# Patient Record
Sex: MALE | Race: WHITE | NOT HISPANIC OR LATINO | Employment: FULL TIME | URBAN - NONMETROPOLITAN AREA
[De-identification: names, ages, dates, MRNs, and addresses within clinical notes are randomized per-mention and may not be internally consistent; named-entity substitution may affect disease eponyms.]

---
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
## 2024-03-26 ENCOUNTER — OFFICE VISIT (OUTPATIENT)
Dept: FAMILY MEDICINE | Facility: OTHER | Age: 19
End: 2024-03-26
Attending: PHYSICIAN ASSISTANT
Payer: COMMERCIAL

## 2024-03-26 VITALS
HEART RATE: 68 BPM | WEIGHT: 231 LBS | RESPIRATION RATE: 20 BRPM | SYSTOLIC BLOOD PRESSURE: 138 MMHG | HEIGHT: 70 IN | BODY MASS INDEX: 33.07 KG/M2 | TEMPERATURE: 97.5 F | OXYGEN SATURATION: 97 % | DIASTOLIC BLOOD PRESSURE: 84 MMHG

## 2024-03-26 DIAGNOSIS — R25.1 SHAKINESS: ICD-10-CM

## 2024-03-26 DIAGNOSIS — E66.811 CLASS 1 OBESITY: ICD-10-CM

## 2024-03-26 DIAGNOSIS — R39.15 URINARY URGENCY: Primary | ICD-10-CM

## 2024-03-26 DIAGNOSIS — R53.82 CHRONIC FATIGUE: ICD-10-CM

## 2024-03-26 DIAGNOSIS — Z83.3 FAMILY HISTORY OF DIABETES MELLITUS: ICD-10-CM

## 2024-03-26 LAB
ALBUMIN SERPL BCG-MCNC: 4.8 G/DL (ref 3.5–5.2)
ALBUMIN UR-MCNC: NEGATIVE MG/DL
ALP SERPL-CCNC: 77 U/L (ref 65–260)
ALT SERPL W P-5'-P-CCNC: 43 U/L (ref 0–50)
AMORPH CRY #/AREA URNS HPF: ABNORMAL /HPF
ANION GAP SERPL CALCULATED.3IONS-SCNC: 9 MMOL/L (ref 7–15)
APPEARANCE UR: ABNORMAL
AST SERPL W P-5'-P-CCNC: 27 U/L (ref 0–35)
BASOPHILS # BLD AUTO: 0.1 10E3/UL (ref 0–0.2)
BASOPHILS NFR BLD AUTO: 1 %
BILIRUB SERPL-MCNC: 0.3 MG/DL
BILIRUB UR QL STRIP: NEGATIVE
BUN SERPL-MCNC: 11.9 MG/DL (ref 6–20)
CALCIUM SERPL-MCNC: 9.5 MG/DL (ref 8.6–10)
CHLORIDE SERPL-SCNC: 104 MMOL/L (ref 98–107)
COLOR UR AUTO: YELLOW
CREAT SERPL-MCNC: 0.79 MG/DL (ref 0.67–1.17)
CREAT UR-MCNC: 218.5 MG/DL
DEPRECATED HCO3 PLAS-SCNC: 27 MMOL/L (ref 22–29)
EGFRCR SERPLBLD CKD-EPI 2021: >90 ML/MIN/1.73M2
EOSINOPHIL # BLD AUTO: 0.3 10E3/UL (ref 0–0.7)
EOSINOPHIL NFR BLD AUTO: 4 %
ERYTHROCYTE [DISTWIDTH] IN BLOOD BY AUTOMATED COUNT: 11.6 % (ref 10–15)
GLUCOSE SERPL-MCNC: 90 MG/DL (ref 70–99)
GLUCOSE UR STRIP-MCNC: NEGATIVE MG/DL
HBA1C MFR BLD: 5 % (ref 4–6.2)
HCT VFR BLD AUTO: 40.4 % (ref 40–53)
HGB BLD-MCNC: 13.5 G/DL (ref 13.3–17.7)
HGB UR QL STRIP: ABNORMAL
IMM GRANULOCYTES # BLD: 0 10E3/UL
IMM GRANULOCYTES NFR BLD: 0 %
KETONES UR STRIP-MCNC: NEGATIVE MG/DL
LEUKOCYTE ESTERASE UR QL STRIP: NEGATIVE
LYMPHOCYTES # BLD AUTO: 2.4 10E3/UL (ref 0.8–5.3)
LYMPHOCYTES NFR BLD AUTO: 39 %
MCH RBC QN AUTO: 29.7 PG (ref 26.5–33)
MCHC RBC AUTO-ENTMCNC: 33.4 G/DL (ref 31.5–36.5)
MCV RBC AUTO: 89 FL (ref 78–100)
MICROALBUMIN UR-MCNC: <12 MG/L
MICROALBUMIN/CREAT UR: NORMAL MG/G{CREAT}
MONOCYTES # BLD AUTO: 0.6 10E3/UL (ref 0–1.3)
MONOCYTES NFR BLD AUTO: 10 %
MUCOUS THREADS #/AREA URNS LPF: PRESENT /LPF
NEUTROPHILS # BLD AUTO: 2.9 10E3/UL (ref 1.6–8.3)
NEUTROPHILS NFR BLD AUTO: 46 %
NITRATE UR QL: NEGATIVE
NRBC # BLD AUTO: 0 10E3/UL
NRBC BLD AUTO-RTO: 0 /100
PH UR STRIP: 5.5 [PH] (ref 5–9)
PLATELET # BLD AUTO: 327 10E3/UL (ref 150–450)
POTASSIUM SERPL-SCNC: 4 MMOL/L (ref 3.4–5.3)
PROT SERPL-MCNC: 7.4 G/DL (ref 6.4–8.3)
RBC # BLD AUTO: 4.54 10E6/UL (ref 4.4–5.9)
RBC URINE: 2 /HPF
SODIUM SERPL-SCNC: 140 MMOL/L (ref 135–145)
SP GR UR STRIP: 1.02 (ref 1–1.03)
TSH SERPL DL<=0.005 MIU/L-ACNC: 1.33 UIU/ML (ref 0.5–4.3)
UROBILINOGEN UR STRIP-MCNC: NORMAL MG/DL
VIT B12 SERPL-MCNC: 463 PG/ML (ref 232–1245)
VIT D+METAB SERPL-MCNC: 14 NG/ML (ref 20–50)
WBC # BLD AUTO: 6.3 10E3/UL (ref 4–11)
WBC URINE: 2 /HPF

## 2024-03-26 PROCEDURE — 82607 VITAMIN B-12: CPT | Mod: ZL | Performed by: PHYSICIAN ASSISTANT

## 2024-03-26 PROCEDURE — 85025 COMPLETE CBC W/AUTO DIFF WBC: CPT | Mod: ZL | Performed by: PHYSICIAN ASSISTANT

## 2024-03-26 PROCEDURE — 36415 COLL VENOUS BLD VENIPUNCTURE: CPT | Mod: ZL | Performed by: PHYSICIAN ASSISTANT

## 2024-03-26 PROCEDURE — 82306 VITAMIN D 25 HYDROXY: CPT | Mod: ZL | Performed by: PHYSICIAN ASSISTANT

## 2024-03-26 PROCEDURE — G0463 HOSPITAL OUTPT CLINIC VISIT: HCPCS

## 2024-03-26 PROCEDURE — 80053 COMPREHEN METABOLIC PANEL: CPT | Mod: ZL | Performed by: PHYSICIAN ASSISTANT

## 2024-03-26 PROCEDURE — 83036 HEMOGLOBIN GLYCOSYLATED A1C: CPT | Mod: ZL | Performed by: PHYSICIAN ASSISTANT

## 2024-03-26 PROCEDURE — G0463 HOSPITAL OUTPT CLINIC VISIT: HCPCS | Mod: 25

## 2024-03-26 PROCEDURE — 99214 OFFICE O/P EST MOD 30 MIN: CPT | Performed by: PHYSICIAN ASSISTANT

## 2024-03-26 PROCEDURE — 82570 ASSAY OF URINE CREATININE: CPT | Mod: ZL | Performed by: PHYSICIAN ASSISTANT

## 2024-03-26 PROCEDURE — 81001 URINALYSIS AUTO W/SCOPE: CPT | Mod: ZL | Performed by: PHYSICIAN ASSISTANT

## 2024-03-26 PROCEDURE — 84443 ASSAY THYROID STIM HORMONE: CPT | Mod: ZL | Performed by: PHYSICIAN ASSISTANT

## 2024-03-26 ASSESSMENT — ANXIETY QUESTIONNAIRES
3. WORRYING TOO MUCH ABOUT DIFFERENT THINGS: SEVERAL DAYS
7. FEELING AFRAID AS IF SOMETHING AWFUL MIGHT HAPPEN: NOT AT ALL
2. NOT BEING ABLE TO STOP OR CONTROL WORRYING: SEVERAL DAYS
7. FEELING AFRAID AS IF SOMETHING AWFUL MIGHT HAPPEN: NOT AT ALL
6. BECOMING EASILY ANNOYED OR IRRITABLE: NOT AT ALL
GAD7 TOTAL SCORE: 5
5. BEING SO RESTLESS THAT IT IS HARD TO SIT STILL: SEVERAL DAYS
1. FEELING NERVOUS, ANXIOUS, OR ON EDGE: SEVERAL DAYS
8. IF YOU CHECKED OFF ANY PROBLEMS, HOW DIFFICULT HAVE THESE MADE IT FOR YOU TO DO YOUR WORK, TAKE CARE OF THINGS AT HOME, OR GET ALONG WITH OTHER PEOPLE?: NOT DIFFICULT AT ALL
GAD7 TOTAL SCORE: 5
IF YOU CHECKED OFF ANY PROBLEMS ON THIS QUESTIONNAIRE, HOW DIFFICULT HAVE THESE PROBLEMS MADE IT FOR YOU TO DO YOUR WORK, TAKE CARE OF THINGS AT HOME, OR GET ALONG WITH OTHER PEOPLE: NOT DIFFICULT AT ALL
GAD7 TOTAL SCORE: 5
4. TROUBLE RELAXING: SEVERAL DAYS

## 2024-03-26 ASSESSMENT — PATIENT HEALTH QUESTIONNAIRE - PHQ9
10. IF YOU CHECKED OFF ANY PROBLEMS, HOW DIFFICULT HAVE THESE PROBLEMS MADE IT FOR YOU TO DO YOUR WORK, TAKE CARE OF THINGS AT HOME, OR GET ALONG WITH OTHER PEOPLE: NOT DIFFICULT AT ALL
SUM OF ALL RESPONSES TO PHQ QUESTIONS 1-9: 4
SUM OF ALL RESPONSES TO PHQ QUESTIONS 1-9: 4

## 2024-03-26 ASSESSMENT — PAIN SCALES - GENERAL: PAINLEVEL: NO PAIN (0)

## 2024-03-26 NOTE — NURSING NOTE
"Patient presents to the clinic for check for diabetes.    FOOD SECURITY SCREENING QUESTIONS:    The next two questions are to help us understand your food security.  If you are feeling you need any assistance in this area, we have resources available to support you today.    Hunger Vital Signs:  Within the past 12 months we worried whether our food would run out before we got money to buy more. Never  Within the past 12 months the food we bought just didn't last and we didn't have money to get more. Never    Advance Care Directive on file? no  Advance Care Directive provided to patient? Declined.      Chief Complaint   Patient presents with    Urinary Frequency       Initial /84 (BP Location: Right arm, Patient Position: Sitting, Cuff Size: Adult Large)   Pulse 68   Temp 97.5  F (36.4  C) (Tympanic)   Resp 20   Ht 1.778 m (5' 10\")   Wt 104.8 kg (231 lb)   SpO2 97%   BMI 33.15 kg/m   Estimated body mass index is 33.15 kg/m  as calculated from the following:    Height as of this encounter: 1.778 m (5' 10\").    Weight as of this encounter: 104.8 kg (231 lb).  Medication Reconciliation: complete        Aida Yancey LPN     "

## 2024-03-26 NOTE — PATIENT INSTRUCTIONS
Lab work on average will return in 1-2 hours, unless listed otherwise below (your provider will typically review & provide you with results and recommendations within 1 day):  - CBC - blood counts  - CMP/BMP - kidney and electrolyte lab. CMP adds in liver function  - A1c - 3 month average of blood sugars  - Lipid - cholesterol/triglyceride lab  - TSH - thyroid lab  - Urine - checking for protein and sugar/glucose in the urine  - Microalbumin - checking kidney function via urine sample, in addition to above blood work  - Vitamin B12  - Vitamin D - on average takes 1-3 days to return

## 2024-03-26 NOTE — PROGRESS NOTES
Assessment & Plan       ICD-10-CM    1. Urinary urgency  R39.15 CBC and Differential     Comprehensive Metabolic Panel     TSH Reflex GH     Hemoglobin A1c     Albumin Random Urine Quantitative with Creat Ratio     CBC and Differential     Comprehensive Metabolic Panel     TSH Reflex GH     Hemoglobin A1c     UA Macroscopic with reflex to Microscopic and Culture     Albumin Random Urine Quantitative with Creat Ratio     CANCELED: UA Macroscopic with reflex to Microscopic and Culture     CANCELED: UA Macroscopic with reflex to Microscopic and Culture      2. Family history of diabetes mellitus  Z83.3 CBC and Differential     Comprehensive Metabolic Panel     TSH Reflex GH     Hemoglobin A1c     Albumin Random Urine Quantitative with Creat Ratio     CBC and Differential     Comprehensive Metabolic Panel     TSH Reflex GH     Hemoglobin A1c     UA Macroscopic with reflex to Microscopic and Culture     Albumin Random Urine Quantitative with Creat Ratio     CANCELED: UA Macroscopic with reflex to Microscopic and Culture      3. Shakiness  R25.1       4. Chronic fatigue  R53.82 Vitamin B12     Vitamin D Total     Vitamin B12     Vitamin D Total      5. Class 1 obesity  E66.9         Chronic urinary urgency and frequency as well as shakiness.  Stable examination today.  Updated lab work including the following.  CBC with normal red and white blood counts as well as red blood cell indices.  CMP stable, normal renal, hepatic and electrolyte function.  A1c returning normal at 5.0%.  TSH normal.   Family history of diabetes, reviewed recommendations for heart healthy diet.  A1c normal as outlined above.  Shakiness, possibly due to intermittent low blood sugar/hypoglycemia.  Recommend small, frequent meals and snacks.  Recommend increase protein intake.  Recommend to decrease fast food intake, as well as caffeine and high processed foods/carbohydrate rich food.  Chronic fatigue: B12 on the lower end of normal at 463,  "recommend over-the-counter supplementation.  Vitamin D is in process, anticipate that lab work will take 1 to 3 days to return, we will update Tyrese on results and recommendations as available.   Class I obesity, recommend heart healthy diet including increased lean proteins, fruits and vegetables.  Goal exercise 150 minutes of moderate exercise per week.  Weight loss would aid cardiopulmonary health.    BMI  Estimated body mass index is 33.15 kg/m  as calculated from the following:    Height as of this encounter: 1.778 m (5' 10\").    Weight as of this encounter: 104.8 kg (231 lb).   Weight management plan: Discussed healthy diet and exercise guidelines    See Patient Instructions    No follow-ups on file.     Subjective   Tyrese is a 18 year old, presenting for the following health issues:  Urinary Frequency        3/26/2024    10:32 AM   Additional Questions   Roomed by brenda montoya lpn   Accompanied by significant other     History of Present Illness       Reason for visit:  Diabetes testing  Symptom onset:  More than a month  Symptoms include:  Constant urination, tiredness, shaky-hands, headaches, knee-wobbles  Symptom intensity:  Mild  Symptom progression:  Staying the same  Had these symptoms before:  Yes  Has tried/received treatment for these symptoms:  No  What makes it worse:  Bright lights  What makes it better:  Dimmed lighting,my francine    He eats 0-1 servings of fruits and vegetables daily.He consumes 2 sweetened beverage(s) daily.He exercises with enough effort to increase his heart rate 60 or more minutes per day.  He exercises with enough effort to increase his heart rate 5 days per week.   He is taking medications regularly.     Tyrese presents to the clinic today for concerns of possible diabetes.  He notes history in his maternal grand father as well as a paternal familial history (unsure of exact family members).  Symptoms include urinary urgency and frequency, hypersomnia, shakiness of hands and knees " "intermittently.  Declines any unintentional weight loss or gain.  No fruity smelling urine/odor.  No changes to hair, skin and or nails.  He works at [employer], shifts are typically from 9-6 or 10-7.  He typically eats 1-2 meals per day.  His lunch is typically fast food due to short lunchtime.  He also intermittently snacks throughout the day.  He does state that he eats a home-cooked, well-balanced meal for dinner including proteins, vegetables, etc. Caffeine - coffee occasionally in AM. Occasional soda and/or energy drink throughout the day. No neuropathy symptoms.     Review of Systems  Constitutional, HEENT, cardiovascular, pulmonary, GI, , musculoskeletal, neuro, skin, endocrine and psych systems are negative, except as otherwise noted.        Objective    /84 (BP Location: Right arm, Patient Position: Sitting, Cuff Size: Adult Large)   Pulse 68   Temp 97.5  F (36.4  C) (Tympanic)   Resp 20   Ht 1.778 m (5' 10\")   Wt 104.8 kg (231 lb)   SpO2 97%   BMI 33.15 kg/m    Body mass index is 33.15 kg/m .  Physical Exam   GENERAL: alert and no distress  EYES: Eyes grossly normal to inspection, PERRL and conjunctivae and sclerae normal  HENT: ear canals and TM's normal, nose and mouth without ulcers or lesions  NECK: no adenopathy, no asymmetry, masses, or scars  RESP: lungs clear to auscultation - no rales, rhonchi or wheezes  CV: regular rate and rhythm, normal S1 S2, no S3 or S4, no murmur, click or rub, no peripheral edema  ABDOMEN: soft, nontender, no hepatosplenomegaly, no masses and bowel sounds normal  MS: no gross musculoskeletal defects noted, no edema  SKIN: no suspicious lesions or rashes  PSYCH: mentation appears normal, affect normal/bright  LYMPH: normal ant/post cervical, supraclavicular nodes    Results for orders placed or performed in visit on 03/26/24   Comprehensive Metabolic Panel     Status: Normal   Result Value Ref Range    Sodium 140 135 - 145 mmol/L    Potassium 4.0 3.4 - 5.3 " mmol/L    Carbon Dioxide (CO2) 27 22 - 29 mmol/L    Anion Gap 9 7 - 15 mmol/L    Urea Nitrogen 11.9 6.0 - 20.0 mg/dL    Creatinine 0.79 0.67 - 1.17 mg/dL    GFR Estimate >90 >60 mL/min/1.73m2    Calcium 9.5 8.6 - 10.0 mg/dL    Chloride 104 98 - 107 mmol/L    Glucose 90 70 - 99 mg/dL    Alkaline Phosphatase 77 65 - 260 U/L    AST 27 0 - 35 U/L    ALT 43 0 - 50 U/L    Protein Total 7.4 6.4 - 8.3 g/dL    Albumin 4.8 3.5 - 5.2 g/dL    Bilirubin Total 0.3 <=1.2 mg/dL   TSH Reflex GH     Status: Normal   Result Value Ref Range    TSH 1.33 0.50 - 4.30 uIU/mL   Hemoglobin A1c     Status: Normal   Result Value Ref Range    Hemoglobin A1C 5.0 4.0 - 6.2 %   Albumin Random Urine Quantitative with Creat Ratio     Status: None   Result Value Ref Range    Creatinine Urine mg/dL 218.5 mg/dL    Albumin Urine mg/L <12.0 mg/L    Albumin Urine mg/g Cr     Vitamin B12     Status: Normal   Result Value Ref Range    Vitamin B12 463 232 - 1,245 pg/mL   CBC with platelets and differential     Status: None   Result Value Ref Range    WBC Count 6.3 4.0 - 11.0 10e3/uL    RBC Count 4.54 4.40 - 5.90 10e6/uL    Hemoglobin 13.5 13.3 - 17.7 g/dL    Hematocrit 40.4 40.0 - 53.0 %    MCV 89 78 - 100 fL    MCH 29.7 26.5 - 33.0 pg    MCHC 33.4 31.5 - 36.5 g/dL    RDW 11.6 10.0 - 15.0 %    Platelet Count 327 150 - 450 10e3/uL    % Neutrophils 46 %    % Lymphocytes 39 %    % Monocytes 10 %    % Eosinophils 4 %    % Basophils 1 %    % Immature Granulocytes 0 %    NRBCs per 100 WBC 0 <1 /100    Absolute Neutrophils 2.9 1.6 - 8.3 10e3/uL    Absolute Lymphocytes 2.4 0.8 - 5.3 10e3/uL    Absolute Monocytes 0.6 0.0 - 1.3 10e3/uL    Absolute Eosinophils 0.3 0.0 - 0.7 10e3/uL    Absolute Basophils 0.1 0.0 - 0.2 10e3/uL    Absolute Immature Granulocytes 0.0 <=0.4 10e3/uL    Absolute NRBCs 0.0 10e3/uL   CBC and Differential     Status: None    Narrative    The following orders were created for panel order CBC and Differential.  Procedure                                Abnormality         Status                     ---------                               -----------         ------                     CBC with platelets and d...[[id number]]                      Final result                 Please view results for these tests on the individual orders.       Signed Electronically by: Juju Tello PA-C

## 2024-03-27 ENCOUNTER — TELEPHONE (OUTPATIENT)
Dept: FAMILY MEDICINE | Facility: OTHER | Age: 19
End: 2024-03-27
Payer: COMMERCIAL

## 2024-03-27 DIAGNOSIS — E55.9 VITAMIN D DEFICIENCY: Primary | ICD-10-CM

## 2024-03-27 RX ORDER — ERGOCALCIFEROL 1.25 MG/1
50000 CAPSULE, LIQUID FILLED ORAL WEEKLY
Qty: 12 CAPSULE | Refills: 0 | Status: SHIPPED | OUTPATIENT
Start: 2024-03-27

## 2024-03-27 NOTE — TELEPHONE ENCOUNTER
Vitamin D2 sent to pharmacy, take 1 capsule (40047 international unit(s)) by mouth once weekly. Recheck levels in 3 months.     Vitamin D2 chosen as D3 flagged with fish oil allergy (throat swelling and hives), vitamin D2 safe per interaction check.     Juju Tello PA-C  3/27/2024  7:03 AM

## 2024-05-05 ENCOUNTER — HEALTH MAINTENANCE LETTER (OUTPATIENT)
Age: 19
End: 2024-05-05

## 2024-10-22 ENCOUNTER — MYC MEDICAL ADVICE (OUTPATIENT)
Dept: PEDIATRICS | Facility: OTHER | Age: 19
End: 2024-10-22
Payer: COMMERCIAL

## 2024-10-22 NOTE — TELEPHONE ENCOUNTER
Message below came in as CRM Request:    ===View-only below this line===      ----- Message -----       From:Tyrese Josue       Sent:10/21/2024  3:59 PM CDT         To:Customer Service    Subject:Primary provider    Topic: Non-Medical Question.    How do I change my primary provider? I m trying to schedule an appointment with anyone as soon as their is availability.    Donnell Bhatia RN on 10/22/2024 at 10:37 AM

## 2025-05-17 ENCOUNTER — HEALTH MAINTENANCE LETTER (OUTPATIENT)
Age: 20
End: 2025-05-17

## (undated) RX ORDER — NEOMYCIN/BACITRACIN/POLYMYXINB 3.5-400-5K
OINTMENT (GRAM) TOPICAL
Status: DISPENSED
Start: 2021-07-20